# Patient Record
Sex: FEMALE | Race: WHITE
[De-identification: names, ages, dates, MRNs, and addresses within clinical notes are randomized per-mention and may not be internally consistent; named-entity substitution may affect disease eponyms.]

---

## 2018-01-03 ENCOUNTER — HOSPITAL ENCOUNTER (OUTPATIENT)
Dept: HOSPITAL 95 - LAB | Age: 83
Discharge: HOME | End: 2018-01-03
Payer: COMMERCIAL

## 2018-01-03 DIAGNOSIS — Z91.89: Primary | ICD-10-CM

## 2018-01-03 PROCEDURE — G0123 SCREEN CERV/VAG THIN LAYER: HCPCS

## 2019-07-22 ENCOUNTER — HOSPITAL ENCOUNTER (INPATIENT)
Dept: HOSPITAL 95 - SURS | Age: 84
LOS: 2 days | Discharge: HOME | DRG: 470 | End: 2019-07-24
Attending: ORTHOPAEDIC SURGERY | Admitting: ORTHOPAEDIC SURGERY
Payer: COMMERCIAL

## 2019-07-22 VITALS — WEIGHT: 130.95 LBS | HEIGHT: 60.98 IN | BODY MASS INDEX: 24.72 KG/M2

## 2019-07-22 DIAGNOSIS — I10: ICD-10-CM

## 2019-07-22 DIAGNOSIS — Z88.5: ICD-10-CM

## 2019-07-22 DIAGNOSIS — Z88.0: ICD-10-CM

## 2019-07-22 DIAGNOSIS — F32.9: ICD-10-CM

## 2019-07-22 DIAGNOSIS — Z86.718: ICD-10-CM

## 2019-07-22 DIAGNOSIS — Z87.891: ICD-10-CM

## 2019-07-22 DIAGNOSIS — M16.12: Primary | ICD-10-CM

## 2019-07-22 DIAGNOSIS — Z88.8: ICD-10-CM

## 2019-07-22 DIAGNOSIS — G43.909: ICD-10-CM

## 2019-07-22 DIAGNOSIS — J45.909: ICD-10-CM

## 2019-07-22 DIAGNOSIS — M19.90: ICD-10-CM

## 2019-07-22 PROCEDURE — C1713 ANCHOR/SCREW BN/BN,TIS/BN: HCPCS

## 2019-07-22 PROCEDURE — C1776 JOINT DEVICE (IMPLANTABLE): HCPCS

## 2019-07-23 PROCEDURE — 0SRB02A REPLACEMENT OF LEFT HIP JOINT WITH METAL ON POLYETHYLENE SYNTHETIC SUBSTITUTE, UNCEMENTED, OPEN APPROACH: ICD-10-PCS | Performed by: ORTHOPAEDIC SURGERY

## 2019-07-23 NOTE — NUR
PT HAS BEEN STABLE POST OP. PT WORKED WELL WITH THERAPY TO AMBULATE AND SIT IN
CHAIR. PT SASHA LIQUIDS. CONT FLUIDS AS ORDERED. PT TO START REGULAR DINNER. PT
VOIDED X1. PRESSURE DRESSING TO LEFT HIP CDI. PT PAIN SASHA WITH SCHEDULED
TYLENOL AND TORADOL. PAS, TEDS AND POLAR PACK ON. SENSATION WNL POST SPINAL
ANESTHESIA. SPOUSE AT BEDSIDE, ATTENTIVE. USES CALL LIGHT APPROPRIATELY AS
NEEDED.

## 2019-07-23 NOTE — NUR
Ambulatory in Day Surgery.
Surgical site prepped with 2% Chlorhexidine cloth wipe.
History, Chart, Medications and Allergies reviewed before start of
procedure. Lungs clear T/O to Auscultation.
Patient confirms NPO status and agrees with scheduled surgery.
Pre-Op teaching done. Pt verbalizes understanding.
Patient reports completing Chlorhexadine shower X2 prior to admission to
hospital.

## 2019-07-23 NOTE — NUR
THERAPY:
PT IN ROOM TO AMBULATE PATIENT. PT TO BATHROOM AND IN CHAIR. SASHA WELL. WILL
MEDICATE WITH SCHEDULED TORADOL.

## 2019-07-23 NOTE — NUR
1410 RED DRNG FROM TOP INCISION SITE SOAKED THRU DRESSING  PRESSURE HELD
KASH ZAIDI CAME IN TOOK A LOOK AND PLACED PRESSURE DRESSING. SHE SPOKE WITH DR PINEDO AND HE WANTED PRESSURE HELD FOR 5 MINUTES AND THIS WAS DONE BY
EMMA ZAIDI PT DID ASK WHAT WAS GOING ON EXPLAINED TO NV REASSURED
HAD NO INCREASE IN PAIN CLEANED PT
UP WARM BLANKETS IN PLACE

## 2019-07-24 LAB
ANION GAP SERPL CALCULATED.4IONS-SCNC: 6 MMOL/L (ref 6–16)
BASOPHILS # BLD AUTO: 0.03 K/MM3 (ref 0–0.23)
BASOPHILS NFR BLD AUTO: 0 % (ref 0–2)
BUN SERPL-MCNC: 18 MG/DL (ref 8–24)
CALCIUM SERPL-MCNC: 7.7 MG/DL (ref 8.5–10.1)
CHLORIDE SERPL-SCNC: 106 MMOL/L (ref 98–108)
CO2 SERPL-SCNC: 26 MMOL/L (ref 21–32)
CREAT SERPL-MCNC: 0.57 MG/DL (ref 0.4–1)
DEPRECATED RDW RBC AUTO: 46 FL (ref 35.1–46.3)
EOSINOPHIL # BLD AUTO: 0.07 K/MM3 (ref 0–0.68)
EOSINOPHIL NFR BLD AUTO: 1 % (ref 0–6)
ERYTHROCYTE [DISTWIDTH] IN BLOOD BY AUTOMATED COUNT: 12.6 % (ref 11.7–14.2)
GLUCOSE SERPL-MCNC: 120 MG/DL (ref 70–99)
HCT VFR BLD AUTO: 30.5 % (ref 33–51)
HGB BLD-MCNC: 9.9 G/DL (ref 11.5–16)
IMM GRANULOCYTES # BLD AUTO: 0.02 K/MM3 (ref 0–0.1)
IMM GRANULOCYTES NFR BLD AUTO: 0 % (ref 0–1)
LYMPHOCYTES # BLD AUTO: 0.88 K/MM3 (ref 0.84–5.2)
LYMPHOCYTES NFR BLD AUTO: 12 % (ref 21–46)
MAGNESIUM SERPL-MCNC: 1.9 MG/DL (ref 1.6–2.4)
MCHC RBC AUTO-ENTMCNC: 32.5 G/DL (ref 31.5–36.5)
MCV RBC AUTO: 100 FL (ref 80–100)
MONOCYTES # BLD AUTO: 0.85 K/MM3 (ref 0.16–1.47)
MONOCYTES NFR BLD AUTO: 12 % (ref 4–13)
NEUTROPHILS # BLD AUTO: 5.35 K/MM3 (ref 1.96–9.15)
NEUTROPHILS NFR BLD AUTO: 74 % (ref 41–73)
NRBC # BLD AUTO: 0 K/MM3 (ref 0–0.02)
NRBC BLD AUTO-RTO: 0 /100 WBC (ref 0–0.2)
PLATELET # BLD AUTO: 229 K/MM3 (ref 150–400)
POTASSIUM SERPL-SCNC: 4.2 MMOL/L (ref 3.5–5.5)
SODIUM SERPL-SCNC: 138 MMOL/L (ref 136–145)

## 2019-07-24 NOTE — NUR
DISCHARGE SUMMARY
PT A&OX4, VSS, LEFT FLOOR VIA WC WITH CNA, TO GO HOME WITH FAMILY, WITH ALL
PERSONAL POSSESSIONS INCLUDING DC PACKET, 3 SMALL AQUACEL DRESSINGS AND 2
MEDIPORE DRESSINGS, 1 ELIQUIS SCRIPT, POLAR SUSAN. DISCHARGE INSTRUCTIONS
PROVIDED. PT REP UNDERSTANDING THOSE INSTRUCTIONS INCLUDING 2 WK FU, S/SX
INFECTION AND DVT/PE, ELIQUIS FOR BLOOD THINNER, CHANGE DRESSINGS ON SUNDAYS.
IV DC'D.

## 2019-07-24 NOTE — NUR
SHIFT SUMMARY:
 
PT POD #1 FOR LEFT TOTAL HIP. PT A&O X4. BP ELEVATED T/O SHIFT. REPORTING PAIN
AS 5/10. DENIES NEED FOR PAIN MEDICATION. PAIN MANAGED WITH TORADOL AND
TYLENOL PER EMAR. AQUACEL AND PRESSURE DRESSING CDI WITH POLAR PACK IN PLACE.
PT OOB SEVERAL TIMES W/FWW+1 ASSIST AND GAIT BELT. SASHA ACTIVITY WELL. PT
NAUSEATED IN BEGINNING OF SHIFT. MEDICATED WITH ZOFRAN AND PHENERGAN. PT
CURRENTLY DENYING N/V. SASHA REG DIET. SALINE LOCKED. VOIDING WELL. PLAN FOR PT
TO WORK WITH PHYSCIAL THERAPY TODAY AND POSSIBLE DISCHARGE.

## 2020-06-19 ENCOUNTER — HOSPITAL ENCOUNTER (OUTPATIENT)
Dept: HOSPITAL 95 - MHTC | Age: 85
Discharge: HOME | End: 2020-06-19
Attending: INTERNAL MEDICINE
Payer: COMMERCIAL

## 2020-06-19 DIAGNOSIS — J45.909: ICD-10-CM

## 2020-06-19 DIAGNOSIS — I10: ICD-10-CM

## 2020-06-19 DIAGNOSIS — R07.9: Primary | ICD-10-CM

## 2020-06-19 NOTE — NUR
1000 PATIENT ARRIVED TO THE [facility]ART [facility], BROUGHT BACK AND CHANGED INTO A GOWN
FOR THE PROCEDURE. UPON HISTORY REVIEW IT WAS NOTED THAT SHE HAS KIRILL LLERGY TO
IODIENE AND HAD NOT BEEN PREVIOUSLY TREATED.  NOTIFIED DR. GREEN AND HE
WANTS THE PATIENT RESCHEDULED AND PREMEDICATED PRIOR TO THE PROCEDURE WITH
PREDNISONE 50 MG 13 HOURS PRIOR, 7 HOURS PRIOR AND 1 HOUR PRIOR. I CALLED THE
OFFICE AND REQUESTED THE SCRIPT BE SENT IN ELECTRONICALLY, WHICH WAS DONE.
THE OFFICE WILL NOTIFY THE PATIENT OF HER RESCHEDULED APPOINTMENT NEXT WEEK.
PATIENT DID STATE THAT SHE WAS HAVING 3/10 CHEST PAIN. MD AWARE AND PATIENT
WAS INSTRUCTED TO GO TO THE EMERGENCY ROOMM AND/OR CALL 911 AND TAKE
NITROGLYCERIN AS DIRECTED. PATIENT STATED THAT SHE UNDERSTOOD THESE
INSTRUCTIONS AND WAS AGREEABLE TO THE RESCHEDULE OF HER CORONARY ANGIOGRAM.
 AT THE BEDSIDE AND ALL QUESTIONS ANSWERED. PATIENT REDRESSED AND WAS
DISCHARGED HOME WITH .

## 2022-10-16 ENCOUNTER — HOSPITAL ENCOUNTER (EMERGENCY)
Dept: HOSPITAL 95 - ER | Age: 87
Discharge: HOME | End: 2022-10-16
Payer: COMMERCIAL

## 2022-10-16 VITALS — WEIGHT: 133 LBS | HEIGHT: 63 IN | BODY MASS INDEX: 23.57 KG/M2

## 2022-10-16 DIAGNOSIS — M25.551: ICD-10-CM

## 2022-10-16 DIAGNOSIS — Z91.013: ICD-10-CM
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
## 2022-10-16 DIAGNOSIS — Z88.8: ICD-10-CM

## 2022-10-16 DIAGNOSIS — Z79.82: ICD-10-CM

## 2022-10-16 DIAGNOSIS — Z88.0: ICD-10-CM

## 2022-10-16 DIAGNOSIS — I10: ICD-10-CM

## 2022-10-16 DIAGNOSIS — Z88.5: ICD-10-CM

## 2022-10-16 DIAGNOSIS — M25.552: Primary | ICD-10-CM

## 2022-10-16 DIAGNOSIS — Z91.09: ICD-10-CM

## 2022-10-16 DIAGNOSIS — Z79.899: ICD-10-CM

## 2022-10-16 LAB
ALBUMIN SERPL BCP-MCNC: 3.4 G/DL (ref 3.4–5)
ALBUMIN/GLOB SERPL: 1.3 {RATIO} (ref 0.8–1.8)
ALT SERPL W P-5'-P-CCNC: 20 U/L (ref 12–78)
ANION GAP SERPL CALCULATED.4IONS-SCNC: 5 MMOL/L (ref 6–16)
AST SERPL W P-5'-P-CCNC: 20 U/L (ref 12–37)
BASOPHILS # BLD AUTO: 0.02 K/MM3 (ref 0–0.23)
BASOPHILS NFR BLD AUTO: 0 % (ref 0–2)
BILIRUB SERPL-MCNC: 0.5 MG/DL (ref 0.1–1)
BUN SERPL-MCNC: 21 MG/DL (ref 8–24)
CALCIUM SERPL-MCNC: 9.1 MG/DL (ref 8.5–10.1)
CHLORIDE SERPL-SCNC: 94 MMOL/L (ref 98–108)
CO2 SERPL-SCNC: 31 MMOL/L (ref 21–32)
CREAT SERPL-MCNC: 0.47 MG/DL (ref 0.4–1)
DEPRECATED RDW RBC AUTO: 42.4 FL (ref 35.1–46.3)
EOSINOPHIL # BLD AUTO: 0.01 K/MM3 (ref 0–0.68)
EOSINOPHIL NFR BLD AUTO: 0 % (ref 0–6)
ERYTHROCYTE [DISTWIDTH] IN BLOOD BY AUTOMATED COUNT: 12.1 % (ref 11.7–14.2)
GLOBULIN SER CALC-MCNC: 2.7 G/DL (ref 2.2–4)
GLUCOSE SERPL-MCNC: 89 MG/DL (ref 70–99)
HCT VFR BLD AUTO: 34.3 % (ref 33–51)
HGB BLD-MCNC: 12.2 G/DL (ref 11.5–16)
IMM GRANULOCYTES # BLD AUTO: 0.04 K/MM3 (ref 0–0.1)
IMM GRANULOCYTES NFR BLD AUTO: 1 % (ref 0–1)
LYMPHOCYTES # BLD AUTO: 0.63 K/MM3 (ref 0.84–5.2)
LYMPHOCYTES NFR BLD AUTO: 8 % (ref 21–46)
MCHC RBC AUTO-ENTMCNC: 35.6 G/DL (ref 31.5–36.5)
MCV RBC AUTO: 95 FL (ref 80–100)
MONOCYTES # BLD AUTO: 0.76 K/MM3 (ref 0.16–1.47)
MONOCYTES NFR BLD AUTO: 9 % (ref 4–13)
NEUTROPHILS # BLD AUTO: 6.91 K/MM3 (ref 1.96–9.15)
NEUTROPHILS NFR BLD AUTO: 83 % (ref 41–73)
NRBC # BLD AUTO: 0 K/MM3 (ref 0–0.02)
NRBC BLD AUTO-RTO: 0 /100 WBC (ref 0–0.2)
PLATELET # BLD AUTO: 268 K/MM3 (ref 150–400)
POTASSIUM SERPL-SCNC: 4.1 MMOL/L (ref 3.5–5.5)
PROT SERPL-MCNC: 6.1 G/DL (ref 6.4–8.2)
SODIUM SERPL-SCNC: 130 MMOL/L (ref 136–145)

## 2022-10-16 PROCEDURE — A9270 NON-COVERED ITEM OR SERVICE: HCPCS

## 2022-11-30 ENCOUNTER — HOSPITAL ENCOUNTER (OUTPATIENT)
Dept: HOSPITAL 95 - LAB SHORT | Age: 87
Discharge: HOME | End: 2022-11-30
Attending: INTERNAL MEDICINE
Payer: COMMERCIAL

## 2022-11-30 DIAGNOSIS — N39.0: Primary | ICD-10-CM

## 2022-11-30 LAB
KETONES UR STRIP-MCNC: (no result) MG/DL
SP GR SPEC: 1.01 (ref 1–1.02)
UROBILINOGEN UR STRIP-MCNC: (no result) MG/DL

## 2023-01-11 ENCOUNTER — HOSPITAL ENCOUNTER (OUTPATIENT)
Dept: HOSPITAL 95 - LAB SHORT | Age: 88
Discharge: HOME | End: 2023-01-11
Attending: INTERNAL MEDICINE
Payer: COMMERCIAL

## 2023-01-11 DIAGNOSIS — N39.0: Primary | ICD-10-CM

## 2023-01-11 LAB
PROT UR STRIP-MCNC: (no result) MG/DL
RBC #/AREA URNS HPF: (no result) /HPF (ref 0–2)
SP GR SPEC: 1.01 (ref 1–1.02)
UROBILINOGEN UR STRIP-MCNC: (no result) MG/DL
WBC #/AREA URNS HPF: (no result) /HPF (ref 0–5)

## 2023-08-14 ENCOUNTER — HOSPITAL ENCOUNTER (OUTPATIENT)
Dept: HOSPITAL 95 - LAB SHORT | Age: 88
End: 2023-08-14
Attending: INTERNAL MEDICINE
Payer: COMMERCIAL

## 2023-08-14 DIAGNOSIS — I10: Primary | ICD-10-CM

## 2023-08-14 DIAGNOSIS — R60.9: ICD-10-CM

## 2023-08-14 LAB
ANION GAP SERPL CALCULATED.4IONS-SCNC: 5 MMOL/L (ref 6–16)
BUN SERPL-MCNC: 17 MG/DL (ref 8–24)
CALCIUM SERPL-MCNC: 9 MG/DL (ref 8.5–10.1)
CHLORIDE SERPL-SCNC: 105 MMOL/L (ref 98–108)
CO2 SERPL-SCNC: 27 MMOL/L (ref 21–32)
CREAT SERPL-MCNC: 0.55 MG/DL (ref 0.4–1)
GLUCOSE SERPL-MCNC: 90 MG/DL (ref 70–99)
POTASSIUM SERPL-SCNC: 3.7 MMOL/L (ref 3.5–5.5)
SODIUM SERPL-SCNC: 137 MMOL/L (ref 136–145)

## 2023-10-20 ENCOUNTER — HOSPITAL ENCOUNTER (OUTPATIENT)
Dept: HOSPITAL 95 - LAB | Age: 88
Discharge: HOME | End: 2023-10-20
Attending: INTERNAL MEDICINE
Payer: COMMERCIAL

## 2023-10-20 DIAGNOSIS — N39.0: Primary | ICD-10-CM
